# Patient Record
Sex: MALE | Race: WHITE | URBAN - METROPOLITAN AREA
[De-identification: names, ages, dates, MRNs, and addresses within clinical notes are randomized per-mention and may not be internally consistent; named-entity substitution may affect disease eponyms.]

---

## 2017-01-24 ENCOUNTER — OFFICE VISIT (OUTPATIENT)
Dept: FAMILY MEDICINE | Facility: CLINIC | Age: 27
End: 2017-01-24
Payer: COMMERCIAL

## 2017-01-24 VITALS
TEMPERATURE: 97 F | RESPIRATION RATE: 16 BRPM | BODY MASS INDEX: 23.99 KG/M2 | HEART RATE: 75 BPM | OXYGEN SATURATION: 98 % | DIASTOLIC BLOOD PRESSURE: 70 MMHG | SYSTOLIC BLOOD PRESSURE: 128 MMHG | HEIGHT: 73 IN | WEIGHT: 181 LBS

## 2017-01-24 DIAGNOSIS — Z00.01 ENCOUNTER FOR ROUTINE ADULT HEALTH EXAMINATION WITH ABNORMAL FINDINGS: Primary | ICD-10-CM

## 2017-01-24 DIAGNOSIS — I86.1 LEFT VARICOCELE: ICD-10-CM

## 2017-01-24 PROCEDURE — 99395 PREV VISIT EST AGE 18-39: CPT | Performed by: FAMILY MEDICINE

## 2017-01-24 NOTE — PROGRESS NOTES
SUBJECTIVE:     CC: Wilbur Looney is an 26 year old male who presents for preventative health visit.     Healthy Habits:    Do you get at least three servings of calcium containing foods daily (dairy, green leafy vegetables, etc.)? yes    Amount of exercise or daily activities, outside of work: 4 day(s) per week    Problems taking medications regularly No    Medication side effects: No    Have you had an eye exam in the past two years? yes    Do you see a dentist twice per year? yes    Do you have sleep apnea, excessive snoring or daytime drowsiness?yes        PROBLEMS TO ADD ON...    Today's PHQ-2 Score:   PHQ-2 ( 1999 Pfizer) 12/8/2014   Q1: Little interest or pleasure in doing things 0   Q2: Feeling down, depressed or hopeless 0   PHQ-2 Score 0       Abuse: Current or Past(Physical, Sexual or Emotional)- No  Do you feel safe in your environment - Yes    Social History   Substance Use Topics     Smoking status: Never Smoker      Smokeless tobacco: Never Used     Alcohol Use: Not on file     The patient does not drink >3 drinks per day nor >7 drinks per week.    Last PSA: No results found for: PSA    No results for input(s): CHOL, HDL, LDL, TRIG, CHOLHDLRATIO, NHDL in the last 50748 hours.    Reviewed orders with patient. Reviewed health maintenance and updated orders accordingly - Yes    All Histories reviewed and updated in Epic.      ROS:nno major concerns, getting  in July, discussed HPV but it doesn't seem necessary. We discussed his left varicocele,  Still pretty small and unlikely to be a problem but if they have trouble getting pregnant we could do a sperm count and see if there is may be a problem  That could be fixed with a surgical fix. We talked about anxiety and sweating. It's really not bad enough to do anything about it. He is generally pretty comfortable with where he is at.  C: NEGATIVE for fever, chills, change in weight  I: NEGATIVE for worrisome rashes, moles or lesions  E: NEGATIVE  for vision changes or irritation  ENT: NEGATIVE for ear, mouth and throat problems  R: NEGATIVE for significant cough or SOB  CV: NEGATIVE for chest pain, palpitations or peripheral edema  GI: NEGATIVE for nausea, abdominal pain, heartburn, or change in bowel habits   male: negative for dysuria, hematuria, decreased urinary stream, erectile dysfunction, urethral discharge  M: NEGATIVE for significant arthralgias or myalgia  N: NEGATIVE for weakness, dizziness or paresthesias  P: NEGATIVE for changes in mood or affect    Problem list, Medication list, Allergies, and Medical/Social/Surgical histories reviewed in [facility] and updated as appropriate.  OBJECTIVE:     There were no vitals taken for this visit.  EXAM:  GENERAL: healthy, alert and no distress  EYES: Eyes grossly normal to inspection, PERRL and conjunctivae and sclerae normal  HENT: ear canals and TM's normal, nose and mouth without ulcers or lesions  NECK: no adenopathy, no asymmetry, masses, or scars and thyroid normal to palpation  RESP: lungs clear to auscultation - no rales, rhonchi or wheezes  CV: regular rate and rhythm, normal S1 S2, no S3 or S4, no murmur, click or rub, no peripheral edema and peripheral pulses strong  ABDOMEN: soft, nontender, no hepatosplenomegaly, no masses and bowel sounds normal   (male): testicles normal without atrophy or masses, varicocele present left, no hernias and penis normal without urethral discharge  MS: no gross musculoskeletal defects noted, no edema  SKIN: no suspicious lesions or rashes  NEURO: Normal strength and tone, mentation intact and speech normal  PSYCH: mentation appears normal, affect normal/bright    ASSESSMENT/PLAN:     1. Encounter for routine adult health examination with abnormal findings      2. Left varicocele        COUNSELING:  Reviewed preventive health counseling, as reflected in patient instructions       Regular exercise       Healthy diet/nutrition         reports that he has never smoked.  "He has never used smokeless tobacco.    Estimated body mass index is 23.18 kg/(m^2) as calculated from the following:    Height as of 12/8/14: 6' 2.75\" (1.899 m).    Weight as of 12/8/14: 184 lb 4.8 oz (83.598 kg).       Counseling Resources:  ATP IV Guidelines  Pooled Cohorts Equation Calculator  FRAX Risk Assessment  ICSI Preventive Guidelines  Dietary Guidelines for Americans, 2010  USDA's MyPlate  ASA Prophylaxis  Lung CA Screening    Wilbur Camarena MD  [facility]    "

## 2017-01-24 NOTE — NURSING NOTE
"Chief Complaint   Patient presents with     Physical     initial /70 mmHg  Pulse 75  Temp(Src) 97  F (36.1  C) (Oral)  Resp 16  Ht 6' 1.25\" (1.861 m)  Wt 181 lb (82.101 kg)  BMI 23.71 kg/m2  SpO2 98% Estimated body mass index is 23.71 kg/(m^2) as calculated from the following:    Height as of this encounter: 6' 1.25\" (1.861 m).    Weight as of this encounter: 181 lb (82.101 kg).  BP completed using cuff size: regular.   R arm      Health Maintenance that is potentially due pending provider review:  NONE    n/a    Norris Dalton ma  "

## 2017-01-24 NOTE — PATIENT INSTRUCTIONS
Preventive Health Recommendations  Male Ages 18 - 25     Yearly exam:             See your health care provider every year in order to  o   Review health changes.   o   Discuss preventive care.    o   Review your medicines if your doctor has prescribed any.    You should be tested each year for STDs (sexually transmitted diseases).     Talk to your provider about cholesterol testing.      If you are at risk for diabetes, you should have a diabetes test (fasting glucose).    Shots: Get a flu shot each year. Get a tetanus shot every 10 years.     Nutrition:    Eat at least 5 servings of fruits and vegetables daily.     Eat whole-grain bread, whole-wheat pasta and brown rice instead of white grains and rice.     Talk to your provider about calcium and Vitamin D.     Lifestyle    Exercise for at least 150 minutes a week (30 minutes a day, 5 days a week). This will help you control your weight and prevent disease.     Limit alcohol to one drink per day.     No smoking.     Wear sunscreen to prevent skin cancer.     See your dentist every six months for an exam and cleaning.       Preventive Health Recommendations  Male Ages 18 - 25     Yearly exam:             See your health care provider every year in order to  o   Review health changes.   o   Discuss preventive care.    o   Review your medicines if your doctor has prescribed any.  You should be tested each year for STDs (sexually transmitted diseases).   Talk to your provider about cholesterol testing.    If you are at risk for diabetes, you should have a diabetes test (fasting glucose).    Shots: Get a flu shot each year. Get a tetanus shot every 10 years.     Nutrition:  Eat at least 5 servings of fruits and vegetables daily.   Eat whole-grain bread, whole-wheat pasta and brown rice instead of white grains and rice.   Talk to your provider about calcium and Vitamin D.     Lifestyle  Exercise for at least 150 minutes a week (30 minutes a day, 5 days a week). This  will help you control your weight and prevent disease.   Limit alcohol to one drink per day.   No smoking.   Wear sunscreen to prevent skin cancer.   See your dentist every six months for an exam and cleaning.

## 2019-05-07 ENCOUNTER — TELEPHONE (OUTPATIENT)
Dept: FAMILY MEDICINE | Facility: CLINIC | Age: 29
End: 2019-05-07

## 2019-05-07 NOTE — TELEPHONE ENCOUNTER
5/7/2019    Call Regarding ReattributionPhysical    Attempt 1    Message on answering machine    Comments:       Outreach   JAMARCUS

## 2019-12-04 NOTE — TELEPHONE ENCOUNTER
12/4/2019    Call Regarding ReattributionPhysical       Attempt 2    Message on voicemail    Comments:       Outreach   CS

## 2019-12-13 NOTE — TELEPHONE ENCOUNTER
12/13/2019    Call Regarding ReattributionPhysical       Attempt 3    Message on voicemail    Comments:       Outreach   MILAN

## 2020-09-25 ENCOUNTER — TELEPHONE (OUTPATIENT)
Dept: TRANSPLANT | Facility: CLINIC | Age: 30
End: 2020-09-25

## 2020-09-25 NOTE — TELEPHONE ENCOUNTER
"[facility] BREEZE  [id number]      LIVING KIDNEY DONOR EVALUATION  Donor First Name Wilbur Donor MRN    Donor Last Name Aayush Completed 2020 3:09 PM    1990 Record ID [id number]   BREEZE Screen PASSED     Intended Recipient  Recipient First Name Amy Recipient MRN    Recipient Last Name Aayush Relationship Parent   Recipient  [id number] Recipient Diagnosis    Recipient's ABO      Donor Information  Age 30 Gender Male   Ht 185 cm (6' 1'') Race    Wt 83.9 kg (185 lbs) Ethnicity Not /   BMI 24.40 kg/m  Preferred Language English      Required No     Blood Type Unknown   Demographics  Home Address [address] [place] [address] # [id number]   [facility] Type [facility] #    [facility] [id number] Type    Country [place] Preferred Contact day Mon, Fri, Thur, Wed, Tue   Email [email or web address]) Preferred Contact time 11:00 AM-1:00 PM   &&   Donor's Medical Information  Medical History None Reported Medications Fish Oil   Multivitamin   Surgical History Repair Torn Meniscus, Left Allergies NKDA   Social History EtOH: Occasional (1-2 drinks/week)   Illicit Drug Use: Denies   Tobacco: Denies Self-Reported Functional Status \"I am able to participate in strenuous sports such as swimming, singles tennis, football, basketball, or skiing\"   Family Medical History Cancer (denies)   Diabetes (denies)   Heart Disease (denies)   Hypertension (Mother)   Kidney Disease (Sibling, Mother)   Kidney Stones (denies) Exercise Frequency Exercise (3 X per week)   Review of Organ Systems  Review of Systems Airway or Lungs: No   Blood Disorder: No   Cancer: No   Diabetes,Thyroid,Adrenal,Endocrine Disorder: No   Digestive or Liver: No   Heart or Circulatory System: No   Immune Diseases: No   Kidneys and Bladder: No   Male Health: No   Muscles,Bones,Joints: No   Neuro: No   Psych: No   &&   Donor's Social Information  Marital Status  Living Accommodation Owns own " home/apartment   Level of Education Graduate or professional degree complete Living Arrangement With spouse   Employment Status Full Time Concerns: health and life insurance No   Employer [employer] Concerns: job security and lost income No   Occupation      Medical Insurance Status Has medical insurance     High Risk Behavior  High Risk Behaviors Blood transfusion < 12 months. (NO)   Commercial sex < 12 months. (NO)   Illicit IV drug use < 5yrs. (NO)   Male:male sexual contact < 5yrs. (NO)   Other high risk sexual contact < 12 months. (NO)   Reason for Donation  Referral Tx Candidate Reason for Donation If I am a match and can give my mom a new kidney and thus a better state of life I absolutely want to without a doubt.   Permission to Disclose Inquiry Yes Patient Comments    Donor Motivation Level Highly motivated donor     PCP Contact  PCP Name    PCP City    PCP State    PCP Phone    Emergency Contact  First Name Cassy First Name Regan   Last Name Aayush Last Name Aayush   Phone # [phone] Phone # [phone]   Phone Type Mobile Phone Type Mobile   Relationship Spouse Relationship Father   Office Use  Reviewed By    Reviewed 9/25/2020 3:25 PM   Admin Folder Archive   Comments    Lost for Followup    Extended Comments    BREEZE ID [email or web address]:[id number] survey status completed   Activity History  Call  Due Date 9/25/2020   Last Modified Date/Time 9/25/2020 3:05 PM   Comments    Call  Due Date 9/25/2020   Last Modified Date/Time 9/25/2020 11:50 AM   Comments

## 2020-10-03 ENCOUNTER — MEDICAL CORRESPONDENCE (OUTPATIENT)
Dept: HEALTH INFORMATION MANAGEMENT | Facility: CLINIC | Age: 30
End: 2020-10-03

## 2020-10-07 DIAGNOSIS — Z00.5 TRANSPLANT DONOR EVALUATION: Primary | ICD-10-CM

## 2020-10-12 DIAGNOSIS — Z00.5 TRANSPLANT DONOR EVALUATION: Primary | ICD-10-CM

## 2020-10-13 ENCOUNTER — HOSPITAL ENCOUNTER (OUTPATIENT)
Dept: ULTRASOUND IMAGING | Facility: CLINIC | Age: 30
Discharge: HOME | End: 2020-10-13
Admitting: RADIOLOGY
Payer: COMMERCIAL

## 2020-10-13 DIAGNOSIS — Z00.5 TRANSPLANT DONOR EVALUATION: ICD-10-CM

## 2020-10-13 PROCEDURE — 76770 US EXAM ABDO BACK WALL COMP: CPT

## 2020-10-19 ENCOUNTER — TELEPHONE (OUTPATIENT)
Dept: TRANSPLANT | Facility: CLINIC | Age: 30
End: 2020-10-19

## 2020-10-19 ENCOUNTER — DOCUMENTATION ONLY (OUTPATIENT)
Dept: TRANSPLANT | Facility: CLINIC | Age: 30
End: 2020-10-19

## 2020-10-19 NOTE — PROGRESS NOTES
LM for Dr Myers to contact this donor re: US of Kidneys results and he did. Stated he's referring him to General Nephrologist.

## 2020-12-03 ENCOUNTER — PRE VISIT (OUTPATIENT)
Dept: NEPHROLOGY | Facility: CLINIC | Age: 30
End: 2020-12-03

## 2020-12-03 ENCOUNTER — OFFICE VISIT (OUTPATIENT)
Dept: NEPHROLOGY | Facility: CLINIC | Age: 30
End: 2020-12-03
Attending: INTERNAL MEDICINE
Payer: COMMERCIAL

## 2020-12-03 VITALS
HEIGHT: 73 IN | RESPIRATION RATE: 16 BRPM | SYSTOLIC BLOOD PRESSURE: 140 MMHG | OXYGEN SATURATION: 99 % | WEIGHT: 180.3 LBS | HEART RATE: 76 BPM | TEMPERATURE: 98.1 F | BODY MASS INDEX: 23.89 KG/M2 | DIASTOLIC BLOOD PRESSURE: 92 MMHG

## 2020-12-03 DIAGNOSIS — I15.8 OTHER SECONDARY HYPERTENSION: ICD-10-CM

## 2020-12-03 DIAGNOSIS — Q61.2 ADPKD (AUTOSOMAL DOMINANT POLYCYSTIC KIDNEY DISEASE): ICD-10-CM

## 2020-12-03 DIAGNOSIS — E87.6 HYPOKALEMIA: ICD-10-CM

## 2020-12-03 DIAGNOSIS — Q61.2 ADPKD (AUTOSOMAL DOMINANT POLYCYSTIC KIDNEY DISEASE): Primary | ICD-10-CM

## 2020-12-03 LAB
ALBUMIN SERPL-MCNC: 4.4 G/DL (ref 3.4–5)
ALBUMIN UR-MCNC: NEGATIVE MG/DL
ALP SERPL-CCNC: 54 U/L (ref 40–150)
ALT SERPL W P-5'-P-CCNC: 36 U/L (ref 0–70)
ANION GAP SERPL CALCULATED.3IONS-SCNC: 7 MMOL/L (ref 3–14)
APPEARANCE UR: CLEAR
AST SERPL W P-5'-P-CCNC: 25 U/L (ref 0–45)
BILIRUB SERPL-MCNC: 0.4 MG/DL (ref 0.2–1.3)
BILIRUB UR QL STRIP: NEGATIVE
BUN SERPL-MCNC: 26 MG/DL (ref 7–30)
CALCIUM SERPL-MCNC: 9.3 MG/DL (ref 8.5–10.1)
CHLORIDE SERPL-SCNC: 101 MMOL/L (ref 94–109)
CO2 SERPL-SCNC: 30 MMOL/L (ref 20–32)
COLOR UR AUTO: NORMAL
CREAT SERPL-MCNC: 1.15 MG/DL (ref 0.66–1.25)
CREAT UR-MCNC: 65 MG/DL
ERYTHROCYTE [DISTWIDTH] IN BLOOD BY AUTOMATED COUNT: 11.4 % (ref 10–15)
GFR SERPL CREATININE-BSD FRML MDRD: 85 ML/MIN/{1.73_M2}
GLUCOSE SERPL-MCNC: 87 MG/DL (ref 70–99)
GLUCOSE UR STRIP-MCNC: NEGATIVE MG/DL
HCT VFR BLD AUTO: 44.4 % (ref 40–53)
HGB BLD-MCNC: 14.8 G/DL (ref 13.3–17.7)
HGB UR QL STRIP: NEGATIVE
KETONES UR STRIP-MCNC: NEGATIVE MG/DL
LEUKOCYTE ESTERASE UR QL STRIP: NEGATIVE
MCH RBC QN AUTO: 29.5 PG (ref 26.5–33)
MCHC RBC AUTO-ENTMCNC: 33.3 G/DL (ref 31.5–36.5)
MCV RBC AUTO: 88 FL (ref 78–100)
NITRATE UR QL: NEGATIVE
PH UR STRIP: 7 PH (ref 5–7)
PLATELET # BLD AUTO: 253 10E9/L (ref 150–450)
POTASSIUM SERPL-SCNC: 3.3 MMOL/L (ref 3.4–5.3)
PROT SERPL-MCNC: 8 G/DL (ref 6.8–8.8)
PROT UR-MCNC: 0.08 G/L
PROT/CREAT 24H UR: 0.12 G/G CR (ref 0–0.2)
RBC # BLD AUTO: 5.02 10E12/L (ref 4.4–5.9)
RBC #/AREA URNS AUTO: <1 /HPF (ref 0–2)
SODIUM SERPL-SCNC: 138 MMOL/L (ref 133–144)
SOURCE: NORMAL
SP GR UR STRIP: 1.01 (ref 1–1.03)
UROBILINOGEN UR STRIP-MCNC: 0 MG/DL (ref 0–2)
WBC # BLD AUTO: 8.4 10E9/L (ref 4–11)
WBC #/AREA URNS AUTO: <1 /HPF (ref 0–5)

## 2020-12-03 PROCEDURE — 99205 OFFICE O/P NEW HI 60 MIN: CPT | Performed by: INTERNAL MEDICINE

## 2020-12-03 PROCEDURE — 80053 COMPREHEN METABOLIC PANEL: CPT | Performed by: PATHOLOGY

## 2020-12-03 PROCEDURE — 36415 COLL VENOUS BLD VENIPUNCTURE: CPT | Performed by: PATHOLOGY

## 2020-12-03 PROCEDURE — 85027 COMPLETE CBC AUTOMATED: CPT | Performed by: PATHOLOGY

## 2020-12-03 PROCEDURE — 81001 URINALYSIS AUTO W/SCOPE: CPT | Performed by: PATHOLOGY

## 2020-12-03 PROCEDURE — 84156 ASSAY OF PROTEIN URINE: CPT | Performed by: PATHOLOGY

## 2020-12-03 RX ORDER — AMLODIPINE BESYLATE 2.5 MG/1
2.5 TABLET ORAL DAILY
Qty: 90 TABLET | Refills: 4 | Status: SHIPPED | OUTPATIENT
Start: 2020-12-03 | End: 2021-01-16

## 2020-12-03 SDOH — HEALTH STABILITY: PHYSICAL HEALTH: ON AVERAGE, HOW MANY DAYS PER WEEK DO YOU ENGAGE IN MODERATE TO STRENUOUS EXERCISE (LIKE A BRISK WALK)?: 3 DAYS

## 2020-12-03 SDOH — ECONOMIC STABILITY: INCOME INSECURITY: HOW HARD IS IT FOR YOU TO PAY FOR THE VERY BASICS LIKE FOOD, HOUSING, MEDICAL CARE, AND HEATING?: NOT ASKED

## 2020-12-03 SDOH — ECONOMIC STABILITY: FOOD INSECURITY: WITHIN THE PAST 12 MONTHS, YOU WORRIED THAT YOUR FOOD WOULD RUN OUT BEFORE YOU GOT MONEY TO BUY MORE.: NOT ASKED

## 2020-12-03 SDOH — HEALTH STABILITY: PHYSICAL HEALTH: ON AVERAGE, HOW MANY MINUTES DO YOU ENGAGE IN EXERCISE AT THIS LEVEL?: 60 MIN

## 2020-12-03 SDOH — ECONOMIC STABILITY: TRANSPORTATION INSECURITY
IN THE PAST 12 MONTHS, HAS LACK OF TRANSPORTATION KEPT YOU FROM MEETINGS, WORK, OR FROM GETTING THINGS NEEDED FOR DAILY LIVING?: NOT ASKED

## 2020-12-03 SDOH — ECONOMIC STABILITY: TRANSPORTATION INSECURITY
IN THE PAST 12 MONTHS, HAS THE LACK OF TRANSPORTATION KEPT YOU FROM MEDICAL APPOINTMENTS OR FROM GETTING MEDICATIONS?: NOT ASKED

## 2020-12-03 SDOH — ECONOMIC STABILITY: FOOD INSECURITY: WITHIN THE PAST 12 MONTHS, THE FOOD YOU BOUGHT JUST DIDN'T LAST AND YOU DIDN'T HAVE MONEY TO GET MORE.: NOT ASKED

## 2020-12-03 SDOH — HEALTH STABILITY: MENTAL HEALTH
STRESS IS WHEN SOMEONE FEELS TENSE, NERVOUS, ANXIOUS, OR CAN'T SLEEP AT NIGHT BECAUSE THEIR MIND IS TROUBLED. HOW STRESSED ARE YOU?: NOT ASKED

## 2020-12-03 ASSESSMENT — MIFFLIN-ST. JEOR: SCORE: 1840.97

## 2020-12-03 ASSESSMENT — PAIN SCALES - GENERAL: PAINLEVEL: NO PAIN (0)

## 2020-12-03 NOTE — NURSING NOTE
"Chief Complaint   Patient presents with     Consult     PCKD     Vital signs:  Temp: 98.1  F (36.7  C) Temp src: Oral BP: (!) 146/84 Pulse: 76   Resp: 16 SpO2: 99 %     Height: 186.1 cm (6' 1.27\") Weight: 81.8 kg (180 lb 4.8 oz)  Estimated body mass index is 23.61 kg/m  as calculated from the following:    Height as of this encounter: 1.861 m (6' 1.27\").    Weight as of this encounter: 81.8 kg (180 lb 4.8 oz).        Preethi Gonzalez, [facility]  12/3/2020 3:26 PM      "

## 2020-12-03 NOTE — LETTER
Date:December 4, 2020      Patient was self referred, no letter generated. Do not send.        [facility] Health Information

## 2020-12-03 NOTE — PROGRESS NOTES
Nephrology Clinic    Wilbur Looney MRN:[id number] YOB: 1990  Date of Service: 12/03/2020  Primary care provider: No Ref-Primary, Physician  Requesting physician      REASON FOR CONSULT: PKD    HISTORY OF PRESENT ILLNESS:   Wilbur Looney is a 30 year old male who presents for evaluation of polycystic kidney disease.  Mr Looney found out about PKD when he was evaluated as a potential kidney donor for his mother, and had a renal ultrasound in October 2020.  His mother has only recently been diagnosed with renal failure and been referred for transplantation.  He has never had gross hematuria, flank pain, or kidney stones.  He was told he had high blood pressure at a routine physical a couple of years ago, but has not had a follow up about that, nor was he started on medication.  He is generally healthy, and works out 2-3 x a week for 1 hour each (lifts weights and works out in the gym).  He denies headaches, or abdominal pain.      Family history of PKD.   He has 2 sisters:  One older (31) who was also found have cystic kidneys.  It appears she may be on tolvaptan (is on medication and was told she had to drink a lot of water)  One younger sister (26) who does not have cysts on US.  1 maternal uncle:  Not known to have kidney disease - unclear whether he was tested (?).  3 young cousins (<12 y.o. with no known disease).       PAST MEDICAL HISTORY:  Previously healthy.    PAST SURGICAL HISTORY:  Left knee meniscal surgery    MEDICATIONS:  Multivitamin.    No prescription meds  Prescription Medications as of 12/3/2020       Rx Number Disp Refills Start End Last Dispensed Date Next Fill Date Owning Pharmacy    amLODIPine (NORVASC) 2.5 MG tablet  90 tablet 4 12/3/2020    [id number] - [place], [place] - [address] AT [facility]    Sig: Take 1 tablet (2.5 mg) by mouth daily    Class: E-Prescribe    Route: Oral    Multiple Vitamin (MULTIVITAMIN PO)         [id number] - [facility] [address] AT [facility]    Sig: Take 1 tablet by mouth daily    Class: Historical    Route: Oral         ALLERGIES:    Allergies   Allergen Reactions     Seasonal Allergies      Unknown [No Clinical Screening - See Comments] Milagros Rice      REVIEW OF SYSTEMS:  Review Of Systems  Skin: negative  Eyes: negative  Ears/Nose/Throat: negative  Respiratory: No shortness of breath, dyspnea on exertion, cough, or hemoptysis  Cardiovascular: negative  Gastrointestinal: negative  Genitourinary: negative  Musculoskeletal: negative  Neurologic: negative    A comprehensive review of systems was performed and found to be negative except as described here or above.  SOCIAL HISTORY:   Social History     Socioeconomic History     Marital status:      Spouse name: Not on file     Number of children: 1     Years of education: Not on file     Highest education level: Not on file   Occupational History     Not on file   Social Needs     Financial resource strain: Not on file     Food insecurity     Worry: Not on file     Inability: Not on file     Transportation needs     Medical: Not on file     Non-medical: Not on file   Tobacco Use     Smoking status: Never Smoker     Smokeless tobacco: Never Used   Substance and Sexual Activity     Alcohol use: Yes     Drug use: No     Sexual activity: Yes     Partners: Female   Lifestyle     Physical activity     Days per week: 3 days     Minutes per session: 60 min     Stress: Not on file   Relationships     Social connections     Talks on phone: Not on file     Gets together: Not on file     Attends [employer]al service: Not on file     Active member of club or organization: Not on file     Attends meetings of clubs or organizations: Not on file     Relationship status: Not on file     Intimate partner violence     Fear of current or ex partner: Not on file     Emotionally abused: Not on file      "Physically abused: Not on file     Forced sexual activity: Not on file   Other Topics Concern     Parent/sibling w/ CABG, MI or angioplasty before 65F 55M? No   Social History Narrative    1 daughter Erika 18 months old.    Works in [employer] - currently working from home.    Spouse is an , currently not working.    Spouse is pregnant, 8 weeks.     FAMILY MEDICAL HISTORY:   Family History   Problem Relation Age of Onset     Cancer - colorectal Paternal Grandmother      Allergies Father      Alzheimer Disease Paternal Grandfather      PHYSICAL EXAM:   BP (!) 140/92   Pulse 76   Temp 98.1  F (36.7  C) (Oral)   Resp 16   Ht 1.861 m (6' 1.27\")   Wt 81.8 kg (180 lb 4.8 oz)   SpO2 99%   BMI 23.61 kg/m    GENERAL APPEARANCE: alert and no distress  EYES: nonicteric  HENT: mouth without ulcers or lesions  NECK: supple, no adenopathy  RESP: lungs clear to auscultation   CV: regular rhythm, normal rate, no rub  ABDOMEN: soft, nontender, normal bowel sounds, no HSM .  Kidney's not palpable  Extremities: no clubbing, cyanosis, or edema  MS: no evidence of inflammation in joints, no muscle tenderness  SKIN: no rash  NEURO: mentation intact and speech normal  PSYCH: affect normal/bright   LABS:   Recent Results (from the past 1008 hour(s))   CBC with platelets    Collection Time: 12/03/20  5:08 PM   Result Value Ref Range    WBC 8.4 4.0 - 11.0 10e9/L    RBC Count 5.02 4.4 - 5.9 10e12/L    Hemoglobin 14.8 13.3 - 17.7 g/dL    Hematocrit 44.4 40.0 - 53.0 %    MCV 88 78 - 100 fl    MCH 29.5 26.5 - 33.0 pg    MCHC 33.3 31.5 - 36.5 g/dL    RDW 11.4 10.0 - 15.0 %    Platelet Count 253 150 - 450 10e9/L   Comprehensive metabolic panel    Collection Time: 12/03/20  5:08 PM   Result Value Ref Range    Sodium 138 133 - 144 mmol/L    Potassium 3.3 (L) 3.4 - 5.3 mmol/L    Chloride 101 94 - 109 mmol/L    Carbon Dioxide 30 20 - 32 mmol/L    Anion Gap 7 3 - 14 mmol/L    Glucose 87 70 - 99 mg/dL    Urea Nitrogen " 26 7 - 30 mg/dL    Creatinine 1.15 0.66 - 1.25 mg/dL    GFR Estimate 85 >60 mL/min/[1.73_m2]    GFR Estimate If Black >90 >60 mL/min/[1.73_m2]    Calcium 9.3 8.5 - 10.1 mg/dL    Bilirubin Total 0.4 0.2 - 1.3 mg/dL    Albumin 4.4 3.4 - 5.0 g/dL    Protein Total 8.0 6.8 - 8.8 g/dL    Alkaline Phosphatase 54 40 - 150 U/L    ALT 36 0 - 70 U/L    AST 25 0 - 45 U/L   UA with Microscopic reflex to Culture    Collection Time: 12/03/20  5:15 PM    Specimen: Midstream Urine   Result Value Ref Range    Color Urine Straw     Appearance Urine Clear     Glucose Urine Negative NEG^Negative mg/dL    Bilirubin Urine Negative NEG^Negative    Ketones Urine Negative NEG^Negative mg/dL    Specific Gravity Urine 1.013 1.003 - 1.035    Blood Urine Negative NEG^Negative    pH Urine 7.0 5.0 - 7.0 pH    Protein Albumin Urine Negative NEG^Negative mg/dL    Urobilinogen mg/dL 0.0 0.0 - 2.0 mg/dL    Nitrite Urine Negative NEG^Negative    Leukocyte Esterase Urine Negative NEG^Negative    Source Midstream Urine     WBC Urine <1 0 - 5 /HPF    RBC Urine <1 0 - 2 /HPF   Protein  random urine with Creat Ratio    Collection Time: 12/03/20  5:15 PM   Result Value Ref Range    Protein Random Urine 0.08 g/L    Protein Total Urine g/gr Creatinine 0.12 0 - 0.2 g/g Cr   Creatinine urine calculation only    Collection Time: 12/03/20  5:15 PM   Result Value Ref Range    Creatinine Urine 65 mg/dL     CMP  Recent Labs   Lab Test 12/03/20  1708      POTASSIUM 3.3*   CHLORIDE 101   CO2 30   ANIONGAP 7   GLC 87   BUN 26   CR 1.15   GFRESTIMATED 85   GFRESTBLACK >90   GRACIELA 9.3   PROTTOTAL 8.0   ALBUMIN 4.4   BILITOTAL 0.4   ALKPHOS 54   AST 25   ALT 36     CBC  Recent Labs   Lab Test 12/03/20  1708   HGB 14.8   WBC 8.4   RBC 5.02   HCT 44.4   MCV 88   MCH 29.5   MCHC 33.3   RDW 11.4        URINE STUDIES  Recent Labs   Lab Test 12/03/20  1715   COLOR Straw   APPEARANCE Clear   URINEGLC Negative   URINEBILI Negative   URINEKETONE Negative   SG 1.013    UBLD Negative   URINEPH 7.0   PROTEIN Negative   NITRITE Negative   LEUKEST Negative   RBCU <1   WBCU <1     Recent Labs   Lab Test 12/03/20  1715   UTPG 0.12     Renal US  US RENAL COMPLETE   10/13/2020 12:23 PM      HISTORY: Donor transplant evaluation. Family history of polycystic kidneys.     COMPARISON: None.     FINDINGS:      Right kidney measures 17.3 x 7.3 x 8.2 cm. Cortical thickness measures  0.7 cm AP. There is no hydronephrosis. There are innumerable right  renal cysts, consistent with polycystic kidney disease. The largest  cyst in the interpolar region is measured at 3.4 cm. Several of the  right renal cysts appear to contain septations. No renal calculi or  solid renal masses are evident.      Left kidney measures 19 x 9 x 9.1 cm. Cortical thickness measures 1.2  cm AP. There is no hydronephrosis. Innumerable left renal cysts are  consistent with polycystic kidney disease, with the largest cyst in  the interpolar region measured at 3.1 cm. Several of the left renal  cysts appear to contain septations. No renal calculi or solid renal  masses are evident.      Limited images of the bladder are unremarkable.                                                                     IMPRESSION:   1. Bilateral renal enlargement with innumerable bilateral renal cysts,  consistent with polycystic kidney disease.   2. No hydronephrosis or solid renal masses are identified.     MADINA CROSS MD    R volume 542.23 ml;  Left 814.77.  TKV= 1357.00      ASSESSMENT AND PLAN:   30 y.o dain who was recently been diagnosed with polycystic kidneys upon getting a renal ultrasound as work up for potential kidney donation to his mother.  The family history is consistent with ADPKD.  His kidneys are large, and his renal function is normal.  No proteinuria.  No other symptoms or signs attributable of PKD other than hypertension.   1- PKD.  His height adjusted renal volume places in in the ID risk category.   In addition to  BP control, I introduced him to tolvaptan and informed him of the indication, potential benefits and risks.  I provided him with a brochure, and the REMS forms.  He will review and consider it.   We agreed to reconvene in January to finalize the decision regarding tolvaptan.    I also informed him of the recommendation for a brain MRI to evaluate for cerebral aneurysm.   2- Hypertension.  Given the PKD,  I explained the importance of BP control.  We discussed Na restriction to ~ 2000mg Na per day or less.  I recommended a switch to more vegetarian based diet.    Also started amlodipine 2.5 mg daily.  I am intrigued by the unexpected mild hypokalemia.  He is not on any medications to explain this.  Although the HTN is most likely attributable to PKD,  I will obtain a plasma renin and aldosterone to evaluate for the possibility of primary hyperaldosteronism as a possible contributor for HTN.    RTC in ~ 6 weeks.       Durga Haque MD  Division of Renal Disease and Hypertension  December 3, 2020

## 2020-12-03 NOTE — LETTER
12/3/2020       RE: Wilbur Looney  [address]  [facility] [address]     Dear Colleague,    Thank you for referring your patient, Wilbur Looney, to the [facility] NEPHROLOGY CLINIC [place] at [facility]. Please see a copy of my visit note below.    Nephrology Clinic    Wilbur Looney MRN:[id number] YOB: 1990  Date of Service: 12/03/2020  Primary care provider: No Ref-Primary, Physician  Requesting physician      REASON FOR CONSULT: PKD    HISTORY OF PRESENT ILLNESS:   Wilbur Looney is a 30 year old male who presents for evaluation of polycystic kidney disease.  Mr Looney found out about PKD when he was evaluated as a potential kidney donor for his mother, and had a renal ultrasound in October 2020.  His mother has only recently been diagnosed with renal failure and been referred for transplantation.  He has never had gross hematuria, flank pain, or kidney stones.  He was told he had high blood pressure at a routine physical a couple of years ago, but has not had a follow up about that, nor was he started on medication.  He is generally healthy, and works out 2-3 x a week for 1 hour each (lifts weights and works out in the gym).  He denies headaches, or abdominal pain.      Family history of PKD.   He has 2 sisters:  One older (31) who was also found have cystic kidneys.  It appears she may be on tolvaptan (is on medication and was told she had to drink a lot of water)  One younger sister (26) who does not have cysts on US.  1 maternal uncle:  Not known to have kidney disease - unclear whether he was tested (?).  3 young cousins (<12 y.o. with no known disease).       PAST MEDICAL HISTORY:  Previously healthy.    PAST SURGICAL HISTORY:  Left knee meniscal surgery    MEDICATIONS:  Multivitamin.    No prescription meds  Prescription Medications as of 12/3/2020       Rx Number Disp Refills Start End Last Dispensed Date Next Fill Date Owning  Pharmacy    amLODIPine (NORVASC) 2.5 MG tablet  90 tablet 4 12/3/2020    [id number] - [facility] [address] AT [facility]    Sig: Take 1 tablet (2.5 mg) by mouth daily    Class: E-Prescribe    Route: Oral    Multiple Vitamin (MULTIVITAMIN PO)        [facility][id number] - [place], [place] - [address] AT [facility]    Sig: Take 1 tablet by mouth daily    Class: Historical    Route: Oral         ALLERGIES:    Allergies   Allergen Reactions     Seasonal Allergies      Unknown [No Clinical Screening - See Comments] Swelling     Tahini      REVIEW OF SYSTEMS:  Review Of Systems  Skin: negative  Eyes: negative  Ears/Nose/Throat: negative  Respiratory: No shortness of breath, dyspnea on exertion, cough, or hemoptysis  Cardiovascular: negative  Gastrointestinal: negative  Genitourinary: negative  Musculoskeletal: negative  Neurologic: negative    A comprehensive review of systems was performed and found to be negative except as described here or above.  SOCIAL HISTORY:   Social History     Socioeconomic History     Marital status:      Spouse name: Not on file     Number of children: 1     Years of education: Not on file     Highest education level: Not on file   Occupational History     Not on file   Social Needs     Financial resource strain: Not on file     Food insecurity     Worry: Not on file     Inability: Not on file     Transportation needs     Medical: Not on file     Non-medical: Not on file   Tobacco Use     Smoking status: Never Smoker     Smokeless tobacco: Never Used   Substance and Sexual Activity     Alcohol use: Yes     Drug use: No     Sexual activity: Yes     Partners: Female   Lifestyle     Physical activity     Days per week: 3 days     Minutes per session: 60 min     Stress: Not on file   Relationships     Social connections     Talks on phone: Not on file     Gets together: Not on file     Attends  "Yazdanism service: Not on file     Active member of club or organization: Not on file     Attends meetings of clubs or organizations: Not on file     Relationship status: Not on file     Intimate partner violence     Fear of current or ex partner: Not on file     Emotionally abused: Not on file     Physically abused: Not on file     Forced sexual activity: Not on file   Other Topics Concern     Parent/sibling w/ CABG, MI or angioplasty before 65F 55M? No   Social History Narrative    1 daughter Erika 18 months old.    Works in [employer] - currently working from home.    Spouse is an , currently not working.    Spouse is pregnant, 8 weeks.     FAMILY MEDICAL HISTORY:   Family History   Problem Relation Age of Onset     Cancer - colorectal Paternal Grandmother      Allergies Father      Alzheimer Disease Paternal Grandfather      PHYSICAL EXAM:   BP (!) 140/92   Pulse 76   Temp 98.1  F (36.7  C) (Oral)   Resp 16   Ht 1.861 m (6' 1.27\")   Wt 81.8 kg (180 lb 4.8 oz)   SpO2 99%   BMI 23.61 kg/m    GENERAL APPEARANCE: alert and no distress  EYES: nonicteric  HENT: mouth without ulcers or lesions  NECK: supple, no adenopathy  RESP: lungs clear to auscultation   CV: regular rhythm, normal rate, no rub  ABDOMEN: soft, nontender, normal bowel sounds, no HSM .  Kidney's not palpable  Extremities: no clubbing, cyanosis, or edema  MS: no evidence of inflammation in joints, no muscle tenderness  SKIN: no rash  NEURO: mentation intact and speech normal  PSYCH: affect normal/bright   LABS:   Recent Results (from the past 1008 hour(s))   CBC with platelets    Collection Time: 12/03/20  5:08 PM   Result Value Ref Range    WBC 8.4 4.0 - 11.0 10e9/L    RBC Count 5.02 4.4 - 5.9 10e12/L    Hemoglobin 14.8 13.3 - 17.7 g/dL    Hematocrit 44.4 40.0 - 53.0 %    MCV 88 78 - 100 fl    MCH 29.5 26.5 - 33.0 pg    MCHC 33.3 31.5 - 36.5 g/dL    RDW 11.4 10.0 - 15.0 %    Platelet Count 253 150 - 450 10e9/L "   Comprehensive metabolic panel    Collection Time: 12/03/20  5:08 PM   Result Value Ref Range    Sodium 138 133 - 144 mmol/L    Potassium 3.3 (L) 3.4 - 5.3 mmol/L    Chloride 101 94 - 109 mmol/L    Carbon Dioxide 30 20 - 32 mmol/L    Anion Gap 7 3 - 14 mmol/L    Glucose 87 70 - 99 mg/dL    Urea Nitrogen 26 7 - 30 mg/dL    Creatinine 1.15 0.66 - 1.25 mg/dL    GFR Estimate 85 >60 mL/min/[1.73_m2]    GFR Estimate If Black >90 >60 mL/min/[1.73_m2]    Calcium 9.3 8.5 - 10.1 mg/dL    Bilirubin Total 0.4 0.2 - 1.3 mg/dL    Albumin 4.4 3.4 - 5.0 g/dL    Protein Total 8.0 6.8 - 8.8 g/dL    Alkaline Phosphatase 54 40 - 150 U/L    ALT 36 0 - 70 U/L    AST 25 0 - 45 U/L   UA with Microscopic reflex to Culture    Collection Time: 12/03/20  5:15 PM    Specimen: Midstream Urine   Result Value Ref Range    Color Urine Straw     Appearance Urine Clear     Glucose Urine Negative NEG^Negative mg/dL    Bilirubin Urine Negative NEG^Negative    Ketones Urine Negative NEG^Negative mg/dL    Specific Gravity Urine 1.013 1.003 - 1.035    Blood Urine Negative NEG^Negative    pH Urine 7.0 5.0 - 7.0 pH    Protein Albumin Urine Negative NEG^Negative mg/dL    Urobilinogen mg/dL 0.0 0.0 - 2.0 mg/dL    Nitrite Urine Negative NEG^Negative    Leukocyte Esterase Urine Negative NEG^Negative    Source Midstream Urine     WBC Urine <1 0 - 5 /HPF    RBC Urine <1 0 - 2 /HPF   Protein  random urine with Creat Ratio    Collection Time: 12/03/20  5:15 PM   Result Value Ref Range    Protein Random Urine 0.08 g/L    Protein Total Urine g/gr Creatinine 0.12 0 - 0.2 g/g Cr   Creatinine urine calculation only    Collection Time: 12/03/20  5:15 PM   Result Value Ref Range    Creatinine Urine 65 mg/dL     CMP  Recent Labs   Lab Test 12/03/20  1708      POTASSIUM 3.3*   CHLORIDE 101   CO2 30   ANIONGAP 7   GLC 87   BUN 26   CR 1.15   GFRESTIMATED 85   GFRESTBLACK >90   GRACIELA 9.3   PROTTOTAL 8.0   ALBUMIN 4.4   BILITOTAL 0.4   ALKPHOS 54   AST 25   ALT 36      CBC  Recent Labs   Lab Test 12/03/20  1708   HGB 14.8   WBC 8.4   RBC 5.02   HCT 44.4   MCV 88   MCH 29.5   MCHC 33.3   RDW 11.4        URINE STUDIES  Recent Labs   Lab Test 12/03/20  1715   COLOR Straw   APPEARANCE Clear   URINEGLC Negative   URINEBILI Negative   URINEKETONE Negative   SG 1.013   UBLD Negative   URINEPH 7.0   PROTEIN Negative   NITRITE Negative   LEUKEST Negative   RBCU <1   WBCU <1     Recent Labs   Lab Test 12/03/20  1715   UTPG 0.12     Renal US  US RENAL COMPLETE   10/13/2020 12:23 PM      HISTORY: Donor transplant evaluation. Family history of polycystic kidneys.     COMPARISON: None.     FINDINGS:      Right kidney measures 17.3 x 7.3 x 8.2 cm. Cortical thickness measures  0.7 cm AP. There is no hydronephrosis. There are innumerable right  renal cysts, consistent with polycystic kidney disease. The largest  cyst in the interpolar region is measured at 3.4 cm. Several of the  right renal cysts appear to contain septations. No renal calculi or  solid renal masses are evident.      Left kidney measures 19 x 9 x 9.1 cm. Cortical thickness measures 1.2  cm AP. There is no hydronephrosis. Innumerable left renal cysts are  consistent with polycystic kidney disease, with the largest cyst in  the interpolar region measured at 3.1 cm. Several of the left renal  cysts appear to contain septations. No renal calculi or solid renal  masses are evident.      Limited images of the bladder are unremarkable.                                                                     IMPRESSION:   1. Bilateral renal enlargement with innumerable bilateral renal cysts,  consistent with polycystic kidney disease.   2. No hydronephrosis or solid renal masses are identified.     MADINA CROSS MD    R volume 542.23 ml;  Left 814.77.  TKV= 1357.00      ASSESSMENT AND PLAN:   30 y.o dain who was recently been diagnosed with polycystic kidneys upon getting a renal ultrasound as work up for potential kidney  donation to his mother.  The family history is consistent with ADPKD.  His kidneys are large, and his renal function is normal.  No proteinuria.  No other symptoms or signs attributable of PKD other than hypertension.   1- PKD.  His height adjusted renal volume places in in the ID risk category.   In addition to BP control, I introduced him to tolvaptan and informed him of the indication, potential benefits and risks.  I provided him with a brochure, and the REMS forms.  He will review and consider it.   We agreed to reconvene in January to finalize the decision regarding tolvaptan.    I also informed him of the recommendation for a brain MRI to evaluate for cerebral aneurysm.   2- Hypertension.  Given the PKD,  I explained the importance of BP control.  We discussed Na restriction to ~ 2000mg Na per day or less.  I recommended a switch to more vegetarian based diet.    Also started amlodipine 2.5 mg daily.  I am intrigued by the unexpected mild hypokalemia.  He is not on any medications to explain this.  Although the HTN is most likely attributable to PKD,  I will obtain a plasma renin and aldosterone to evaluate for the possibility of primary hyperaldosteronism as a possible contributor for HTN.    RTC in ~ 6 weeks.       Durga Haque MD  Division of Renal Disease and Hypertension  December 3, 2020          Again, thank you for allowing me to participate in the care of your patient.      Sincerely,    Durga Haque MD

## 2021-01-10 ENCOUNTER — HEALTH MAINTENANCE LETTER (OUTPATIENT)
Age: 31
End: 2021-01-10

## 2021-01-15 ENCOUNTER — VIRTUAL VISIT (OUTPATIENT)
Dept: NEPHROLOGY | Facility: CLINIC | Age: 31
End: 2021-01-15
Attending: INTERNAL MEDICINE
Payer: COMMERCIAL

## 2021-01-15 DIAGNOSIS — Q61.3 PKD (POLYCYSTIC KIDNEY DISEASE): Primary | ICD-10-CM

## 2021-01-15 PROCEDURE — 99213 OFFICE O/P EST LOW 20 MIN: CPT | Mod: 95 | Performed by: INTERNAL MEDICINE

## 2021-01-15 NOTE — LETTER
1/15/2021       RE: Wilbur Looney  [address]  [facility] [address]     Dear Colleague,    Thank you for referring your patient, Wilbur Looney, to the [facility] NEPHROLOGY CLINIC [place] at [facility]. Please see a copy of my visit note below.    Patient reports that his BP has been on average 116/65 - he has decided not to take the amlodipine and instead has chosen a natural veggie supplement for BP.    Wilbur is a 30 year old who is being evaluated via a billable video visit.      How would you like to obtain your AVS? MyChart    Will anyone else be joining your video visit? No    Video Start Time: 11.00  Video-Visit Details    Type of service:  Video Visit    Video End Time:11.25    Originating Location (pt. Location): Home    Distant Location (provider location):  [facility] NEPHROLOGY [facility]     Platform used for Video Visit: [facility]      Nephrology Clinic  January 15, 2021    Wilbur Looney MRN:[id number] YOB: 1990  Date of Service: 01/15/2021  Primary care provider: No Ref-Primary, Physician  Requesting physician      REASON FOR CONSULT: PKD    HISTORY OF PRESENT ILLNESS:   Wilbur Looney is a 30 year old male who presents for evaluation of polycystic kidney disease.  Mr Looney found out about PKD when he was evaluated as a potential kidney donor for his mother, and had a renal ultrasound in October 2020.  His mother has only recently been diagnosed with renal failure and been referred for transplantation.  He has never had gross hematuria, flank pain, or kidney stones.  He was told he had high blood pressure at a routine physical a couple of years ago, but has not had a follow up about that, nor was he started on medication.  He is generally healthy, and works out 2-3 x a week for 1 hour each (lifts weights and works out in the gym).  He denies headaches, or abdominal pain.      Family history of PKD.   He has  2 sisters:  One older (31) who was also found have cystic kidneys.  It appears she may be on tolvaptan (is on medication and was told she had to drink a lot of water)  One younger sister (26) who does not have cysts on US.  1 maternal uncle:  Not known to have kidney disease - unclear whether he was tested (?).  3 young cousins (<12 y.o. with no known disease).    January 15, 2021  Mr Looney feels generally well.  He has had no acute illness since our last encounter in Dec 2020.  He has been monitoring his BP as home, and reports av BP of 122/74 in Dec (av of 5 readings on different days) and 119/68 in January (av of 5 readings on different days).  He did NOT start the amlodipine as prescribed, as he wished to monitor his BP first.    He also reports taking a food supplement purported to reduce BP, [id number] Vegi-Capsule (several herbs)  He denies flank pain, dysuria or gross hematuria. No resp, GI symptoms.  No L Ext swelling.    His wife is expecting daughter #2 in July.  Their daughter Erika is now 1.5 y.o.         PAST MEDICAL HISTORY:  Previously healthy.    PAST SURGICAL HISTORY:  Left knee meniscal surgery    MEDICATIONS:  Multivitamin.    No prescription meds  Prescription Medications as of 1/15/2021       Rx Number Disp Refills Start End Last Dispensed Date Next Fill Date Owning Pharmacy    Multiple Vitamin (MULTIVITAMIN PO)        [id number] - [place], [place] - [address] [place] [address] AT [facility]    Sig: Take 1 tablet by mouth daily    Class: Historical    Route: Oral    amLODIPine (NORVASC) 2.5 MG tablet    NOT TAKING  90 tablet 4 12/3/2020        Sig: Take 1 tablet (2.5 mg) by mouth daily    Class: E-Prescribe    Route: Oral        Px Vegi-Capsule (herbal supplement) taking 1 capsule twice daily    ALLERGIES:    Allergies   Allergen Reactions     Seasonal Allergies      Unknown [No Clinical Screening - See Comments] Swelling     Brianani      REVIEW OF SYSTEMS:  A  comprehensive review of systems was performed and found to be negative except as described here or above.  SOCIAL HISTORY:   Social History     Socioeconomic History     Marital status:      Spouse name: Not on file     Number of children: 1 daughter  Erika,  1.5 y.o.     Years of education: Not on file     Highest education level: Not on file   Occupational History     Not on file   Social Needs     Financial resource strain: Not on file     Food insecurity     Worry: Not on file     Inability: Not on file     Transportation needs     Medical: Not on file     Non-medical: Not on file   Tobacco Use     Smoking status: Never Smoker     Smokeless tobacco: Never Used   Substance and Sexual Activity     Alcohol use: Yes     Drug use: No     Sexual activity: Yes     Partners: Female   Lifestyle     Physical activity     Days per week: 3 days     Minutes per session: 60 min     Stress: Not on file   Relationships     Social connections     Talks on phone: Not on file     Gets together: Not on file     Attends Spiritism service: Not on file     Active member of club or organization: Not on file     Attends meetings of clubs or organizations: Not on file     Relationship status: Not on file     Intimate partner violence     Fear of current or ex partner: Not on file     Emotionally abused: Not on file     Physically abused: Not on file     Forced sexual activity: Not on file   Other Topics Concern     Parent/sibling w/ CABG, MI or angioplasty before 65F 55M? No   Social History Narrative    1 daughter Erika 18 months old.    Works in [employer] - currently working from home.    Spouse is an , currently not working.    Spouse is pregnant, 8 weeks.     FAMILY MEDICAL HISTORY:   Family History   Problem Relation Age of Onset     Cancer - colorectal Paternal Grandmother      Allergies Father      Alzheimer Disease Paternal Grandfather      PHYSICAL EXAM:   There were no vitals taken for this  visit.   BP at home as listed in HPI  GENERAL APPEARANCE: alert and no distress  EYES: nonicteric  RESP: No labored breathing   SKIN: no facial rash  NEURO: mentation intact and speech normal  PSYCH: affect normal/bright   LABS:   No results found for this or any previous visit (from the past 1008 hour(s)).  CMP  Recent Labs   Lab Test 12/03/20  1708      POTASSIUM 3.3*   CHLORIDE 101   CO2 30   ANIONGAP 7   GLC 87   BUN 26   CR 1.15   GFRESTIMATED 85   GFRESTBLACK >90   GRACIELA 9.3   PROTTOTAL 8.0   ALBUMIN 4.4   BILITOTAL 0.4   ALKPHOS 54   AST 25   ALT 36     CBC  Recent Labs   Lab Test 12/03/20  1708   HGB 14.8   WBC 8.4   RBC 5.02   HCT 44.4   MCV 88   MCH 29.5   MCHC 33.3   RDW 11.4        URINE STUDIES  Recent Labs   Lab Test 12/03/20  1715   COLOR Straw   APPEARANCE Clear   URINEGLC Negative   URINEBILI Negative   URINEKETONE Negative   SG 1.013   UBLD Negative   URINEPH 7.0   PROTEIN Negative   NITRITE Negative   LEUKEST Negative   RBCU <1   WBCU <1     Recent Labs   Lab Test 12/03/20  1715   UTPG 0.12     Renal US  US RENAL COMPLETE   10/13/2020 12:23 PM      HISTORY: Donor transplant evaluation. Family history of polycystic kidneys.     COMPARISON: None.     FINDINGS:      Right kidney measures 17.3 x 7.3 x 8.2 cm. Cortical thickness measures  0.7 cm AP. There is no hydronephrosis. There are innumerable right  renal cysts, consistent with polycystic kidney disease. The largest  cyst in the interpolar region is measured at 3.4 cm. Several of the  right renal cysts appear to contain septations. No renal calculi or  solid renal masses are evident.      Left kidney measures 19 x 9 x 9.1 cm. Cortical thickness measures 1.2  cm AP. There is no hydronephrosis. Innumerable left renal cysts are  consistent with polycystic kidney disease, with the largest cyst in  the interpolar region measured at 3.1 cm. Several of the left renal  cysts appear to contain septations. No renal calculi or solid renal  masses  are evident.      Limited images of the bladder are unremarkable.                                                                     IMPRESSION:   1. Bilateral renal enlargement with innumerable bilateral renal cysts,  consistent with polycystic kidney disease.   2. No hydronephrosis or solid renal masses are identified.     MADINA CROSS MD    R volume 542.23 ml;  Left 814.77.  TKV= 1357.00      ASSESSMENT AND PLAN:   30 y.o dain who was recently been diagnosed with polycystic kidneys upon getting a renal ultrasound as work up for potential kidney donation to his mother.  The family history is consistent with ADPKD.  His kidneys are large, and his renal function is normal.  No proteinuria.  No other symptoms or signs attributable of PKD other than hypertension.   1- PKD.  His height adjusted renal volume places in in the ID risk category.   I discussed again with Mr Looney the option of tolvaptan therapy including the potential benefits and risks.  Mr Looney is not currently interested in going on a medication with potential side effects and risks of adverse reaction.  Given that his BP is controlled at home and his renal function preserved, he prefers postponing going on medication to later.    We also briefly discussed again getting a brain MRI to evaluate for cerebral aneurysm in the future.   2- Hypertension.  Mr Looney is attentive to his diet, and has been monitoring is BP at home.  His BP is in the desired range without medication.  We will continue with life-style measures as he is doing.   I explained the potential implication of the unexpected mild hypokalemia seen on the labs from Dec 2020.  I will obtain a plasma renin and aldosterone to evaluate for the possibility of primary hyperaldosteronism as a possible contributor for hypokalemia (and HTN).    RTC in ~ 6 months (barring unanticipated results of labs).    Labs ordered:  UA, UPCR, Renal Panel, Plasma renin activity, Plasma Aldosterone.          Durga Haque MD  Division of Renal Disease and Hypertension  January 15, 2021            Again, thank you for allowing me to participate in the care of your patient.      Sincerely,    Durga Haque MD

## 2021-01-15 NOTE — LETTER
Date:January 21, 2021      Patient was self referred, no letter generated. Do not send.        [facility] Health Information

## 2021-01-15 NOTE — PROGRESS NOTES
Patient reports that his BP has been on average 116/65 - he has decided not to take the amlodipine and instead has chosen a natural veggie supplement for BP.    Wilbur is a 30 year old who is being evaluated via a billable video visit.      How would you like to obtain your AVS? MyChart    Will anyone else be joining your video visit? No    Video Start Time: 11.00  Video-Visit Details    Type of service:  Video Visit    Video End Time:11.25    Originating Location (pt. Location): Home    Distant Location (provider location):  [facility] NEPHROLOGY CLINIC [place]     Platform used for Video Visit: [employer]

## 2021-01-15 NOTE — PROGRESS NOTES
Nephrology Clinic  January 15, 2021    Wilbur Looney MRN:[id number] YOB: 1990  Date of Service: 01/15/2021  Primary care provider: No Ref-Primary, Physician  Requesting physician      REASON FOR CONSULT: PKD    HISTORY OF PRESENT ILLNESS:   Wilbur Looney is a 30 year old male who presents for evaluation of polycystic kidney disease.  Mr Looeny found out about PKD when he was evaluated as a potential kidney donor for his mother, and had a renal ultrasound in October 2020.  His mother has only recently been diagnosed with renal failure and been referred for transplantation.  He has never had gross hematuria, flank pain, or kidney stones.  He was told he had high blood pressure at a routine physical a couple of years ago, but has not had a follow up about that, nor was he started on medication.  He is generally healthy, and works out 2-3 x a week for 1 hour each (lifts weights and works out in the gym).  He denies headaches, or abdominal pain.      Family history of PKD.   He has 2 sisters:  One older (31) who was also found have cystic kidneys.  It appears she may be on tolvaptan (is on medication and was told she had to drink a lot of water)  One younger sister (26) who does not have cysts on US.  1 maternal uncle:  Not known to have kidney disease - unclear whether he was tested (?).  3 young cousins (<12 y.o. with no known disease).    January 15, 2021  Mr Looney feels generally well.  He has had no acute illness since our last encounter in Dec 2020.  He has been monitoring his BP as home, and reports av BP of 122/74 in Dec (av of 5 readings on different days) and 119/68 in January (av of 5 readings on different days).  He did NOT start the amlodipine as prescribed, as he wished to monitor his BP first.    He also reports taking a food supplement purported to reduce BP, [id number] Vegi-Capsule (several herbs)  He denies flank pain, dysuria or gross hematuria. No resp, GI symptoms.  No L Ext  swelling.    His wife is expecting daughter #2 in July.  Their daughter Erika is now 1.5 y.o.         PAST MEDICAL HISTORY:  Previously healthy.    PAST SURGICAL HISTORY:  Left knee meniscal surgery    MEDICATIONS:  Multivitamin.    No prescription meds  Prescription Medications as of 1/15/2021       Rx Number Disp Refills Start End Last Dispensed Date Next Fill Date Owning Pharmacy    Multiple Vitamin (MULTIVITAMIN PO)        [id number] - [place], [place] - [address] [place] [address] RD [address] AT [facility]    Sig: Take 1 tablet by mouth daily    Class: Historical    Route: Oral    amLODIPine (NORVASC) 2.5 MG tablet    NOT TAKING  90 tablet 4 12/3/2020        Sig: Take 1 tablet (2.5 mg) by mouth daily    Class: E-Prescribe    Route: Oral        Px Vegi-Capsule (herbal supplement) taking 1 capsule twice daily    ALLERGIES:    Allergies   Allergen Reactions     Seasonal Allergies      Unknown [No Clinical Screening - See Comments] Swelling     Tahini      REVIEW OF SYSTEMS:  A comprehensive review of systems was performed and found to be negative except as described here or above.  SOCIAL HISTORY:   Social History     Socioeconomic History     Marital status:      Spouse name: Not on file     Number of children: 1 daughter  Erika,  1.5 y.o.     Years of education: Not on file     Highest education level: Not on file   Occupational History     Not on file   Social Needs     Financial resource strain: Not on file     Food insecurity     Worry: Not on file     Inability: Not on file     Transportation needs     Medical: Not on file     Non-medical: Not on file   Tobacco Use     Smoking status: Never Smoker     Smokeless tobacco: Never Used   Substance and Sexual Activity     Alcohol use: Yes     Drug use: No     Sexual activity: Yes     Partners: Female   Lifestyle     Physical activity     Days per week: 3 days     Minutes per session: 60 min     Stress: Not on file   Relationships      Social connections     Talks on phone: Not on file     Gets together: Not on file     Attends Worship service: Not on file     Active member of club or organization: Not on file     Attends meetings of clubs or organizations: Not on file     Relationship status: Not on file     Intimate partner violence     Fear of current or ex partner: Not on file     Emotionally abused: Not on file     Physically abused: Not on file     Forced sexual activity: Not on file   Other Topics Concern     Parent/sibling w/ CABG, MI or angioplasty before 65F 55M? No   Social History Narrative    1 daughter Erika 18 months old.    Works in [employer] - currently working from home.    Spouse is an , currently not working.    Spouse is pregnant, 8 weeks.     FAMILY MEDICAL HISTORY:   Family History   Problem Relation Age of Onset     Cancer - colorectal Paternal Grandmother      Allergies Father      Alzheimer Disease Paternal Grandfather      PHYSICAL EXAM:   There were no vitals taken for this visit.   BP at home as listed in HPI  GENERAL APPEARANCE: alert and no distress  EYES: nonicteric  RESP: No labored breathing   SKIN: no facial rash  NEURO: mentation intact and speech normal  PSYCH: affect normal/bright   LABS:   No results found for this or any previous visit (from the past 1008 hour(s)).  CMP  Recent Labs   Lab Test 12/03/20  1708      POTASSIUM 3.3*   CHLORIDE 101   CO2 30   ANIONGAP 7   GLC 87   BUN 26   CR 1.15   GFRESTIMATED 85   GFRESTBLACK >90   GRACIELA 9.3   PROTTOTAL 8.0   ALBUMIN 4.4   BILITOTAL 0.4   ALKPHOS 54   AST 25   ALT 36     CBC  Recent Labs   Lab Test 12/03/20  1708   HGB 14.8   WBC 8.4   RBC 5.02   HCT 44.4   MCV 88   MCH 29.5   MCHC 33.3   RDW 11.4        URINE STUDIES  Recent Labs   Lab Test 12/03/20  1715   COLOR Straw   APPEARANCE Clear   URINEGLC Negative   URINEBILI Negative   URINEKETONE Negative   SG 1.013   UBLD Negative   URINEPH 7.0   PROTEIN Negative   NITRITE  Negative   LEUKEST Negative   RBCU <1   WBCU <1     Recent Labs   Lab Test 12/03/20  1715   UTPG 0.12     Renal US  US RENAL COMPLETE   10/13/2020 12:23 PM      HISTORY: Donor transplant evaluation. Family history of polycystic kidneys.     COMPARISON: None.     FINDINGS:      Right kidney measures 17.3 x 7.3 x 8.2 cm. Cortical thickness measures  0.7 cm AP. There is no hydronephrosis. There are innumerable right  renal cysts, consistent with polycystic kidney disease. The largest  cyst in the interpolar region is measured at 3.4 cm. Several of the  right renal cysts appear to contain septations. No renal calculi or  solid renal masses are evident.      Left kidney measures 19 x 9 x 9.1 cm. Cortical thickness measures 1.2  cm AP. There is no hydronephrosis. Innumerable left renal cysts are  consistent with polycystic kidney disease, with the largest cyst in  the interpolar region measured at 3.1 cm. Several of the left renal  cysts appear to contain septations. No renal calculi or solid renal  masses are evident.      Limited images of the bladder are unremarkable.                                                                     IMPRESSION:   1. Bilateral renal enlargement with innumerable bilateral renal cysts,  consistent with polycystic kidney disease.   2. No hydronephrosis or solid renal masses are identified.     MADINA CROSS MD    R volume 542.23 ml;  Left 814.77.  TKV= 1357.00      ASSESSMENT AND PLAN:   30 y.o dain who was recently been diagnosed with polycystic kidneys upon getting a renal ultrasound as work up for potential kidney donation to his mother.  The family history is consistent with ADPKD.  His kidneys are large, and his renal function is normal.  No proteinuria.  No other symptoms or signs attributable of PKD other than hypertension.   1- PKD.  His height adjusted renal volume places in in the ID risk category.   I discussed again with Mr Looney the option of tolvaptan therapy including  the potential benefits and risks.  Mr Looney is not currently interested in going on a medication with potential side effects and risks of adverse reaction.  Given that his BP is controlled at home and his renal function preserved, he prefers postponing going on medication to later.    We also briefly discussed again getting a brain MRI to evaluate for cerebral aneurysm in the future.   2- Hypertension.  Mr Looney is attentive to his diet, and has been monitoring is BP at home.  His BP is in the desired range without medication.  We will continue with life-style measures as he is doing.   I explained the potential implication of the unexpected mild hypokalemia seen on the labs from Dec 2020.  I will obtain a plasma renin and aldosterone to evaluate for the possibility of primary hyperaldosteronism as a possible contributor for hypokalemia (and HTN).    RTC in ~ 6 months (barring unanticipated results of labs).    Labs ordered:  UA, UPCR, Renal Panel, Plasma renin activity, Plasma Aldosterone.         Durga Haque MD  Division of Renal Disease and Hypertension  January 15, 2021

## 2021-02-05 DIAGNOSIS — Q61.3 PKD (POLYCYSTIC KIDNEY DISEASE): ICD-10-CM

## 2021-02-05 DIAGNOSIS — Q61.2 ADPKD (AUTOSOMAL DOMINANT POLYCYSTIC KIDNEY DISEASE): Primary | ICD-10-CM

## 2021-02-05 LAB
ALBUMIN UR-MCNC: NEGATIVE MG/DL
APPEARANCE UR: CLEAR
BILIRUB UR QL STRIP: NEGATIVE
COLOR UR AUTO: YELLOW
GLUCOSE UR STRIP-MCNC: NEGATIVE MG/DL
HGB UR QL STRIP: NEGATIVE
KETONES UR STRIP-MCNC: NEGATIVE MG/DL
LEUKOCYTE ESTERASE UR QL STRIP: NEGATIVE
NITRATE UR QL: NEGATIVE
PH UR STRIP: 5.5 PH (ref 5–7)
SOURCE: NORMAL
SP GR UR STRIP: <=1.005 (ref 1–1.03)
UROBILINOGEN UR STRIP-ACNC: 0.2 EU/DL (ref 0.2–1)

## 2021-02-05 PROCEDURE — 99000 SPECIMEN HANDLING OFFICE-LAB: CPT | Performed by: INTERNAL MEDICINE

## 2021-02-05 PROCEDURE — 80053 COMPREHEN METABOLIC PANEL: CPT | Performed by: INTERNAL MEDICINE

## 2021-02-05 PROCEDURE — 81003 URINALYSIS AUTO W/O SCOPE: CPT | Performed by: INTERNAL MEDICINE

## 2021-02-05 PROCEDURE — 36415 COLL VENOUS BLD VENIPUNCTURE: CPT | Performed by: INTERNAL MEDICINE

## 2021-02-05 PROCEDURE — 84156 ASSAY OF PROTEIN URINE: CPT | Performed by: INTERNAL MEDICINE

## 2021-02-05 PROCEDURE — 84100 ASSAY OF PHOSPHORUS: CPT | Performed by: INTERNAL MEDICINE

## 2021-02-05 PROCEDURE — 82088 ASSAY OF ALDOSTERONE: CPT | Performed by: INTERNAL MEDICINE

## 2021-02-05 PROCEDURE — 84244 ASSAY OF RENIN: CPT | Mod: 90 | Performed by: INTERNAL MEDICINE

## 2021-02-08 LAB
ALBUMIN SERPL-MCNC: 4.4 G/DL (ref 3.4–5)
ALP SERPL-CCNC: 50 U/L (ref 40–150)
ALT SERPL W P-5'-P-CCNC: 33 U/L (ref 0–70)
ANION GAP SERPL CALCULATED.3IONS-SCNC: 4 MMOL/L (ref 3–14)
AST SERPL W P-5'-P-CCNC: 20 U/L (ref 0–45)
BILIRUB SERPL-MCNC: 0.3 MG/DL (ref 0.2–1.3)
BUN SERPL-MCNC: 24 MG/DL (ref 7–30)
CALCIUM SERPL-MCNC: 9.3 MG/DL (ref 8.5–10.1)
CHLORIDE SERPL-SCNC: 103 MMOL/L (ref 94–109)
CO2 SERPL-SCNC: 30 MMOL/L (ref 20–32)
CREAT SERPL-MCNC: 1.14 MG/DL (ref 0.66–1.25)
CREAT UR-MCNC: 53 MG/DL
GFR SERPL CREATININE-BSD FRML MDRD: 86 ML/MIN/{1.73_M2}
GLUCOSE SERPL-MCNC: 99 MG/DL (ref 70–99)
PHOSPHATE SERPL-MCNC: 3.8 MG/DL (ref 2.5–4.5)
POTASSIUM SERPL-SCNC: 3.4 MMOL/L (ref 3.4–5.3)
PROT SERPL-MCNC: 7.9 G/DL (ref 6.8–8.8)
PROT UR-MCNC: <0.05 G/L
PROT/CREAT 24H UR: NORMAL G/G CR (ref 0–0.2)
SODIUM SERPL-SCNC: 137 MMOL/L (ref 133–144)

## 2021-02-09 LAB — ALDOST SERPL-MCNC: 9.2 NG/DL (ref 0–31)

## 2021-02-11 LAB — RENIN PLAS-CCNC: 2.3 NG/ML/HR

## 2021-10-23 ENCOUNTER — HEALTH MAINTENANCE LETTER (OUTPATIENT)
Age: 31
End: 2021-10-23

## 2022-02-12 ENCOUNTER — HEALTH MAINTENANCE LETTER (OUTPATIENT)
Age: 32
End: 2022-02-12

## 2022-10-10 ENCOUNTER — HEALTH MAINTENANCE LETTER (OUTPATIENT)
Age: 32
End: 2022-10-10

## 2023-02-18 ENCOUNTER — HEALTH MAINTENANCE LETTER (OUTPATIENT)
Age: 33
End: 2023-02-18

## 2023-04-03 ENCOUNTER — TELEPHONE (OUTPATIENT)
Dept: TRANSPLANT | Facility: CLINIC | Age: 33
End: 2023-04-03
Payer: COMMERCIAL

## 2024-03-16 ENCOUNTER — HEALTH MAINTENANCE LETTER (OUTPATIENT)
Age: 34
End: 2024-03-16

## 2024-12-02 ENCOUNTER — OFFICE VISIT (OUTPATIENT)
Dept: NEPHROLOGY | Facility: CLINIC | Age: 34
End: 2024-12-02
Payer: COMMERCIAL

## 2024-12-02 VITALS
HEART RATE: 81 BPM | DIASTOLIC BLOOD PRESSURE: 103 MMHG | HEIGHT: 73 IN | SYSTOLIC BLOOD PRESSURE: 154 MMHG | WEIGHT: 180 LBS | OXYGEN SATURATION: 100 % | BODY MASS INDEX: 23.86 KG/M2

## 2024-12-02 DIAGNOSIS — Q61.2 AUTOSOMAL DOMINANT POLYCYSTIC KIDNEY DISEASE: Primary | ICD-10-CM

## 2024-12-02 DIAGNOSIS — R03.0 ELEVATED BLOOD PRESSURE READING WITHOUT DIAGNOSIS OF HYPERTENSION: ICD-10-CM

## 2024-12-02 PROCEDURE — 99204 OFFICE O/P NEW MOD 45 MIN: CPT | Performed by: INTERNAL MEDICINE

## 2024-12-02 ASSESSMENT — PAIN SCALES - GENERAL: PAINLEVEL_OUTOF10: NO PAIN (0)

## 2024-12-02 NOTE — NURSING NOTE
Printed AVS and Reviewed after visit instructions with pt:    Repeat BP before leaving today  Have home cuff compared to a clinic cuff at some point in time.   Lab and urine studies  MRA brain ordered  Renal ultrasound ordered.   One year follow-up.     Imaging scheduling [phone]    Pt declined having labs today and will contact a [facility] clinic to have done later this week.  Pt stated he will schedule MRA and U/S when he is at home by his calendar. Imaging phone number given.  Pt will also schedule return appt with Dr. Quintero in one year.  Nephrology scheduling number given.    Encouraged to call or send [facility] if further questions.    Shyanne Multani LPN

## 2024-12-02 NOTE — PATIENT INSTRUCTIONS
Repeat BP before leaving today  Have home cuff compared to a clinic cuff at some point in time.   Lab and urine studies  MRA brain ordered  Renal ultrasound ordered.   One year follow-up.     Imaging scheduling [phone]

## 2024-12-02 NOTE — PROGRESS NOTES
12/02/24  Nephrology Visit    Assessment/Plan:   ADPKD: he would like to follow conservative treatment and not start tolvaptan at this time. Recommend good hydration, avoidance of NSAIDs, minimizing animal proteins, good blood pressure control. Will plan yearly follow-up. Discussed risk of brain aneurysms - recommend MRA of the brain to screen for aneurysms. Will get renal ultrasound at this time to get total kidney volume/Gutierrez Classification score.   Hypertension: blood pressure goal of <125/80. Encouraged him to have his home cuff compared to clinic cuff to confirm accuracy.     Patient Instructions   Repeat BP before leaving today  Have home cuff compared to a clinic cuff at some point in time.   Lab and urine studies  MRA brain ordered  Renal ultrasound ordered.   One year follow-up.     Imaging scheduling [phone]     54 minutes spent by me on the date of the encounter doing chart review, review of test results, interpretation of tests, patient visit, and documentation     Bertha Quintero DO        CC: PCKD    HPI: Wilbur Looney is a 34 year old male who presents for evaluation of PCKD. Previously seen by Dr. Haque and transitioning care to myself today. Dx with PCKD when being evaluated as a potential kidney donor for his mother. Dx with hypertension in late 20s. No hx of brain aneurysms in his family.   Family hx:  =- maternal grandfather - colon CA in his 70s  -  materanl grandmother - 92 years - no kidney disease  - older sister with PCKD  - younger sister - unknown  - mother - transplant 1.5 years ago.   - unknown whether uncle is affected.   No NSAID use. Good with hydration. He is interested in following more conservative measures for his kidney disease.       No current outpatient medications on file.     No current facility-administered medications for this visit.       Exam:  BP (!) 154/103 (BP Location: Left arm, Patient Position: Sitting, Cuff Size: Adult Regular)   Pulse 81   Ht 1.854 m  "(6' 1\")   Wt 81.6 kg (180 lb)   SpO2 100%   BMI 23.75 kg/m    GENERAL APPEARANCE: alert and no distress  CV_ RRR  Ext - no edema    Results:    No visits with results within 1 Day(s) from this visit.   Latest known visit with results is:   Orders Only on 2021   Component Date Value Ref Range Status    Renin Activity 2021 2.3  ng/mL/hr Final    Comment: (Note)  INTERPRETIVE INFORMATION: Renin Activity  Adult, Normal sodium diet:   Supine ................. 0.2-1.6 ng/mL/hr   Upright ................ 0.5-4.0 ng/mL/hr  Children, Normal sodium diet, Supine:    (1-7 days) ..... 2.0-35.0 ng/mL/hr   Cord blood ............. 4.0-32.0 ng/mL/hr   1-12 mos ............... 2.4-37.0 ng/mL/hr   13 mos-3 yrs ........... 1.7-11.2 ng/mL/hr   4-5 yrs ................ 1.0- 6.5 ng/mL/hr   6-10 yrs ............... 0.5- 5.9 ng/mL/hr   11-15 yrs .............. 0.5- 3.3 ng/mL/hr  Children, normal sodium diet, Upright:   0-3 yrs ................ Not Available   4-5 yrs ................ Less than or equal to 15 ng/mL/hr   6-10 yrs ............... Less than or equal to 17 ng/mL/hr   11-15 yrs .............. Less than or equal to 16 ng/mL/hr  Plasma renin activity measures enzyme ability to convert   angiotensinogen to angiotensin I and is limited by the   availability of angiotensinogen. Plasma renin activity is   not an accurate indicator of enzyme acti                           vity when   angiotensinogen is decreased.  This test was developed and its performance characteristics   determined by [facility]. It has not been cleared or   approved by the US Food and Drug Administration. This test   was performed in a CLIA certified laboratory and is   intended for clinical purposes.  Performed By: [facility]  [address] [place] [id number]  : Beata Nobles MD      Color Urine 2021 Yellow   Final    Appearance Urine 2021 Clear   Final    Glucose Urine 2021 " Negative  NEG^Negative mg/dL Final    Bilirubin Urine 02/05/2021 Negative  NEG^Negative Final    Ketones Urine 02/05/2021 Negative  NEG^Negative mg/dL Final    Specific Gravity Urine 02/05/2021 <=1.005  1.003 - 1.035 Final    Blood Urine 02/05/2021 Negative  NEG^Negative Final    pH Urine 02/05/2021 5.5  5.0 - 7.0 pH Final    Protein Albumin Urine 02/05/2021 Negative  NEG^Negative mg/dL Final    Urobilinogen Urine 02/05/2021 0.2  0.2 - 1.0 EU/dL Final    Nitrite Urine 02/05/2021 Negative  NEG^Negative Final    Leukocyte Esterase Urine 02/05/2021 Negative  NEG^Negative Final    Source 02/05/2021 Midstream Urine   Final    Protein Random Urine 02/05/2021 <0.05  g/L Final    Protein Total Urine g/gr Creatinine 02/05/2021 Unable to calculate due to low value  0 - 0.2 g/g Cr Final    Aldosterone 02/05/2021 9.2  0.0 - 31.0 ng/dL Final    Sodium 02/05/2021 137  133 - 144 mmol/L Final    Potassium 02/05/2021 3.4  3.4 - 5.3 mmol/L Final    Chloride 02/05/2021 103  94 - 109 mmol/L Final    Carbon Dioxide 02/05/2021 30  20 - 32 mmol/L Final    Anion Gap 02/05/2021 4  3 - 14 mmol/L Final    Glucose 02/05/2021 99  70 - 99 mg/dL Final    Urea Nitrogen 02/05/2021 24  7 - 30 mg/dL Final    Creatinine 02/05/2021 1.14  0.66 - 1.25 mg/dL Final    GFR Estimate 02/05/2021 86  >60 mL/min/[1.73_m2] Final    Comment: Non  GFR Calc  Starting 12/18/2018, serum creatinine based estimated GFR (eGFR) will be   calculated using the Chronic Kidney Disease Epidemiology Collaboration   (CKD-EPI) equation.      GFR Estimate If Black 02/05/2021 >90  >60 mL/min/[1.73_m2] Final    Comment:  GFR Calc  Starting 12/18/2018, serum creatinine based estimated GFR (eGFR) will be   calculated using the Chronic Kidney Disease Epidemiology Collaboration   (CKD-EPI) equation.      Calcium 02/05/2021 9.3  8.5 - 10.1 mg/dL Final    Bilirubin Total 02/05/2021 0.3  0.2 - 1.3 mg/dL Final    Albumin 02/05/2021 4.4  3.4 - 5.0 g/dL Final     Protein Total 02/05/2021 7.9  6.8 - 8.8 g/dL Final    Alkaline Phosphatase 02/05/2021 50  40 - 150 U/L Final    ALT 02/05/2021 33  0 - 70 U/L Final    AST 02/05/2021 20  0 - 45 U/L Final    Phosphorus 02/05/2021 3.8  2.5 - 4.5 mg/dL Final    Creatinine Urine 02/05/2021 53  mg/dL Final      Lab results were reviewed and interpreted.         Bertha Quintero DO

## 2024-12-02 NOTE — NURSING NOTE
"Wilbur Looney's goals for this visit include:   Chief Complaint   Patient presents with    Consult     PCKD  has seen Dr. Haque       He requests these members of his care team be copied on today's visit information: no    PCP: No Ref-Primary, Physician    Referring Provider:  Referred Self, MD  No address on file    BP (!) 156/94 (BP Location: Left arm, Patient Position: Sitting, Cuff Size: Adult Regular)   Pulse 78   Ht 1.854 m (6' 1\")   Wt 81.6 kg (180 lb)   SpO2 100%   BMI 23.75 kg/m      Do you need any medication refills at today's visit? No    Shyanne Multani LPN     "

## 2024-12-18 ENCOUNTER — MYC MEDICAL ADVICE (OUTPATIENT)
Dept: NEPHROLOGY | Facility: CLINIC | Age: 34
End: 2024-12-18
Payer: COMMERCIAL

## 2024-12-20 NOTE — TELEPHONE ENCOUNTER
Informed patient that lab results from [facility] has been received and sent to lab abstract and [facility] to be scanned into patients chart.    MARY Stringer   Neph/Pulm [facility]

## 2025-01-24 ENCOUNTER — TELEPHONE (OUTPATIENT)
Dept: NEPHROLOGY | Facility: CLINIC | Age: 35
End: 2025-01-24
Payer: COMMERCIAL

## 2025-01-24 NOTE — TELEPHONE ENCOUNTER
Fax received from [facility] [phone]. VitD results dated 1/22/25. Ordered by Dr. Quintero.  Copy of image below.  Copy to be Abstracted to pts chart by [employer] abstract team.    Routed this encounter to Dr. Quintero for review.  Shyanne Multani LPN

## 2025-01-26 ENCOUNTER — MYC MEDICAL ADVICE (OUTPATIENT)
Dept: NEPHROLOGY | Facility: CLINIC | Age: 35
End: 2025-01-26
Payer: COMMERCIAL

## 2025-03-22 ENCOUNTER — HEALTH MAINTENANCE LETTER (OUTPATIENT)
Age: 35
End: 2025-03-22